# Patient Record
Sex: FEMALE | URBAN - METROPOLITAN AREA
[De-identification: names, ages, dates, MRNs, and addresses within clinical notes are randomized per-mention and may not be internally consistent; named-entity substitution may affect disease eponyms.]

---

## 2020-11-30 ENCOUNTER — OFFICE VISIT (OUTPATIENT)
Dept: URBAN - METROPOLITAN AREA CLINIC 27 | Facility: CLINIC | Age: 60
End: 2020-11-30

## 2020-11-30 PROBLEM — 14760008 CONSTIPATION: Status: ACTIVE | Noted: 2020-11-30

## 2020-11-30 PROBLEM — 2901004 MELENA: Status: ACTIVE | Noted: 2020-11-30

## 2020-11-30 PROBLEM — 238136002 MORBID OBESITY: Status: ACTIVE | Noted: 2020-11-30

## 2020-11-30 PROBLEM — 266435005 GASTRO-ESOPHAGEAL REFLUX DISEASE WITHOUT ESOPHAGITIS: Status: ACTIVE | Noted: 2020-11-30

## 2020-12-24 ENCOUNTER — OFFICE VISIT (OUTPATIENT)
Dept: URBAN - METROPOLITAN AREA SURGERY CENTER 7 | Facility: SURGERY CENTER | Age: 60
End: 2020-12-24

## 2022-03-16 ENCOUNTER — OFFICE VISIT (OUTPATIENT)
Dept: URBAN - METROPOLITAN AREA CLINIC 27 | Facility: CLINIC | Age: 62
End: 2022-03-16

## 2022-04-30 ENCOUNTER — TELEPHONE ENCOUNTER (OUTPATIENT)
Dept: URBAN - METROPOLITAN AREA CLINIC 121 | Facility: CLINIC | Age: 62
End: 2022-04-30

## 2022-05-01 ENCOUNTER — TELEPHONE ENCOUNTER (OUTPATIENT)
Dept: URBAN - METROPOLITAN AREA CLINIC 121 | Facility: CLINIC | Age: 62
End: 2022-05-01

## 2022-05-01 RX ORDER — INSULIN GLARGINE 100 [IU]/ML
INJECTION, SOLUTION SUBCUTANEOUS
Status: ACTIVE | COMMUNITY
Start: 2020-11-30

## 2022-05-01 RX ORDER — LISINOPRIL AND HYDROCHLOROTHIAZIDE TABLETS 20; 12.5 MG/1; MG/1
TABLET ORAL
Status: ACTIVE | COMMUNITY
Start: 2020-11-30

## 2022-05-01 RX ORDER — CYCLOBENZAPRINE HYDROCHLORIDE 10 MG/1
TABLET, FILM COATED ORAL
Status: ACTIVE | COMMUNITY
Start: 2020-11-30

## 2022-05-01 RX ORDER — SIMVASTATIN 20 MG/1
TABLET, FILM COATED ORAL
Status: ACTIVE | COMMUNITY
Start: 2020-11-30

## 2022-05-01 RX ORDER — MELOXICAM 7.5 MG/1
TABLET ORAL
Status: ACTIVE | COMMUNITY
Start: 2020-11-30

## 2022-05-01 RX ORDER — HUMAN INSULIN 100 [IU]/ML
INJECTION, SUSPENSION SUBCUTANEOUS
Status: ACTIVE | COMMUNITY
Start: 2020-11-30

## 2023-08-16 ENCOUNTER — OFFICE VISIT (OUTPATIENT)
Dept: URBAN - METROPOLITAN AREA CLINIC 27 | Facility: CLINIC | Age: 63
End: 2023-08-16
Payer: MEDICARE

## 2023-08-16 VITALS
SYSTOLIC BLOOD PRESSURE: 125 MMHG | HEART RATE: 72 BPM | HEIGHT: 64 IN | BODY MASS INDEX: 49.51 KG/M2 | DIASTOLIC BLOOD PRESSURE: 84 MMHG | WEIGHT: 290 LBS

## 2023-08-16 DIAGNOSIS — E78.5 BORDERLINE HYPERLIPIDEMIA: ICD-10-CM

## 2023-08-16 DIAGNOSIS — I10 PRIMARY HYPERTENSION: ICD-10-CM

## 2023-08-16 DIAGNOSIS — K21.9 ACID REFLUX: ICD-10-CM

## 2023-08-16 DIAGNOSIS — R05.1 ACUTE COUGH: ICD-10-CM

## 2023-08-16 PROBLEM — 162864005: Status: ACTIVE | Noted: 2023-08-16

## 2023-08-16 PROBLEM — 235595009: Status: ACTIVE | Noted: 2023-08-16

## 2023-08-16 PROCEDURE — 99244 OFF/OP CNSLTJ NEW/EST MOD 40: CPT | Performed by: PHYSICIAN ASSISTANT

## 2023-08-16 PROCEDURE — 99214 OFFICE O/P EST MOD 30 MIN: CPT | Performed by: PHYSICIAN ASSISTANT

## 2023-08-16 RX ORDER — LISINOPRIL AND HYDROCHLOROTHIAZIDE TABLETS 20; 12.5 MG/1; MG/1
TABLET ORAL
Status: ACTIVE | COMMUNITY
Start: 2020-11-30

## 2023-08-16 RX ORDER — INSULIN GLARGINE 100 [IU]/ML
INJECTION, SOLUTION SUBCUTANEOUS
Status: ACTIVE | COMMUNITY
Start: 2020-11-30

## 2023-08-16 RX ORDER — HUMAN INSULIN 100 [IU]/ML
INJECTION, SUSPENSION SUBCUTANEOUS
Status: ACTIVE | COMMUNITY
Start: 2020-11-30

## 2023-08-16 RX ORDER — SIMVASTATIN 20 MG/1
TABLET, FILM COATED ORAL
Status: ACTIVE | COMMUNITY
Start: 2020-11-30

## 2023-08-16 RX ORDER — CYCLOBENZAPRINE HYDROCHLORIDE 10 MG/1
TABLET, FILM COATED ORAL
Status: ACTIVE | COMMUNITY
Start: 2020-11-30

## 2023-08-16 RX ORDER — MELOXICAM 7.5 MG/1
TABLET ORAL
Status: ACTIVE | COMMUNITY
Start: 2020-11-30

## 2023-08-16 NOTE — HPI-TODAY'S VISIT:
Ms. Duarte is a 63-year-old femaoe seen at the rerquest of Dr. Powers for suboptimally controlled reflux. A copy of this document will be sent to the referring physician. She has GERD for greater than 10 years. Historically, she had substernal buring and water brash. She used to take Omeprazole 20mg which controlled her symptoms. She stopped taking the medication for 3 years. She hstill had reflux symptoms but tried to manage it with her diet. She restarted Omeprazole 18 months ago with occasional breathrogih symptoms. She developed a productive cough that srated 3 months ago. Her PCP dx'd her with allergies. She tried allergy medication to no avail.  She recently saw Dr. Grullon, ENT,  last week for a worsening cough. Dr. Grullon dx'd her with reflux and increased her Omeprazole to 40 mg bid. She reports an 80% reduction in her cough since increasing the medication. She follows an anti-reflux diet. She has coughing spells while eating. She denies dysphagia, odynophagia, she adela abdomainl pain.

## 2023-08-18 ENCOUNTER — TELEPHONE ENCOUNTER (OUTPATIENT)
Dept: URBAN - METROPOLITAN AREA CLINIC 27 | Facility: CLINIC | Age: 63
End: 2023-08-18

## 2023-08-24 ENCOUNTER — OFFICE VISIT (OUTPATIENT)
Dept: URBAN - METROPOLITAN AREA SURGERY CENTER 7 | Facility: SURGERY CENTER | Age: 63
End: 2023-08-24
Payer: MEDICARE

## 2023-08-24 ENCOUNTER — CLAIMS CREATED FROM THE CLAIM WINDOW (OUTPATIENT)
Dept: URBAN - METROPOLITAN AREA CLINIC 4 | Facility: CLINIC | Age: 63
End: 2023-08-24
Payer: MEDICARE

## 2023-08-24 ENCOUNTER — WEB ENCOUNTER (OUTPATIENT)
Dept: URBAN - METROPOLITAN AREA CLINIC 27 | Facility: CLINIC | Age: 63
End: 2023-08-24

## 2023-08-24 DIAGNOSIS — K29.60 ADENOPAPILLOMATOSIS GASTRICA: ICD-10-CM

## 2023-08-24 DIAGNOSIS — K29.70 GASTRITIS, UNSPECIFIED, WITHOUT BLEEDING: ICD-10-CM

## 2023-08-24 DIAGNOSIS — R12 BURNING REFLUX: ICD-10-CM

## 2023-08-24 PROCEDURE — G8907 PT DOC NO EVENTS ON DISCHARG: HCPCS | Performed by: INTERNAL MEDICINE

## 2023-08-24 PROCEDURE — 88342 IMHCHEM/IMCYTCHM 1ST ANTB: CPT | Performed by: PATHOLOGY

## 2023-08-24 PROCEDURE — 88305 TISSUE EXAM BY PATHOLOGIST: CPT | Performed by: PATHOLOGY

## 2023-08-24 PROCEDURE — 43239 EGD BIOPSY SINGLE/MULTIPLE: CPT | Performed by: INTERNAL MEDICINE

## 2023-08-24 RX ORDER — SIMVASTATIN 20 MG/1
TABLET, FILM COATED ORAL
Status: ACTIVE | COMMUNITY
Start: 2020-11-30

## 2023-08-24 RX ORDER — LISINOPRIL AND HYDROCHLOROTHIAZIDE TABLETS 20; 12.5 MG/1; MG/1
TABLET ORAL
Status: ACTIVE | COMMUNITY
Start: 2020-11-30

## 2023-08-24 RX ORDER — MELOXICAM 7.5 MG/1
TABLET ORAL
Status: ACTIVE | COMMUNITY
Start: 2020-11-30

## 2023-08-24 RX ORDER — CYCLOBENZAPRINE HYDROCHLORIDE 10 MG/1
TABLET, FILM COATED ORAL
Status: ACTIVE | COMMUNITY
Start: 2020-11-30

## 2023-08-24 RX ORDER — INSULIN GLARGINE 100 [IU]/ML
INJECTION, SOLUTION SUBCUTANEOUS
Status: ACTIVE | COMMUNITY
Start: 2020-11-30

## 2023-08-24 RX ORDER — HUMAN INSULIN 100 [IU]/ML
INJECTION, SUSPENSION SUBCUTANEOUS
Status: ACTIVE | COMMUNITY
Start: 2020-11-30

## 2023-11-06 ENCOUNTER — OFFICE VISIT (OUTPATIENT)
Dept: URBAN - METROPOLITAN AREA CLINIC 27 | Facility: CLINIC | Age: 63
End: 2023-11-06
Payer: MEDICARE

## 2023-11-06 ENCOUNTER — OFFICE VISIT (OUTPATIENT)
Dept: URBAN - METROPOLITAN AREA CLINIC 27 | Facility: CLINIC | Age: 63
End: 2023-11-06

## 2023-11-06 VITALS — HEIGHT: 64 IN

## 2023-11-06 DIAGNOSIS — E66.01 MORBID (SEVERE) OBESITY DUE TO EXCESS CALORIES: ICD-10-CM

## 2023-11-06 DIAGNOSIS — K21.9 GASTRO-ESOPHAGEAL REFLUX DISEASE WITHOUT ESOPHAGITIS: ICD-10-CM

## 2023-11-06 PROCEDURE — 99214 OFFICE O/P EST MOD 30 MIN: CPT | Performed by: INTERNAL MEDICINE

## 2023-11-06 RX ORDER — INSULIN GLARGINE 100 [IU]/ML
INJECTION, SOLUTION SUBCUTANEOUS
Status: ACTIVE | COMMUNITY
Start: 2020-11-30

## 2023-11-06 RX ORDER — LISINOPRIL AND HYDROCHLOROTHIAZIDE TABLETS 20; 12.5 MG/1; MG/1
TABLET ORAL
Status: ACTIVE | COMMUNITY
Start: 2020-11-30

## 2023-11-06 RX ORDER — MELOXICAM 7.5 MG/1
TABLET ORAL
Status: ACTIVE | COMMUNITY
Start: 2020-11-30

## 2023-11-06 RX ORDER — CYCLOBENZAPRINE HYDROCHLORIDE 10 MG/1
TABLET, FILM COATED ORAL
Status: ACTIVE | COMMUNITY
Start: 2020-11-30

## 2023-11-06 RX ORDER — HUMAN INSULIN 100 [IU]/ML
INJECTION, SUSPENSION SUBCUTANEOUS
Status: ACTIVE | COMMUNITY
Start: 2020-11-30

## 2023-11-06 RX ORDER — SIMVASTATIN 20 MG/1
TABLET, FILM COATED ORAL
Status: ACTIVE | COMMUNITY
Start: 2020-11-30

## 2023-11-06 NOTE — HPI-TODAY'S VISIT:
This is a sick day 3-year-old female seen for her throat and raspiness she has had some mild cervical dysphagia for the past month and associated cough.  She also has sinus drainage and is using a nasal spray.  Recent EGD showed hiatal hernia biopsies were unrevealing.  She also uses CPAP.  She has reflux and takes omeprazole 40 mg twice a day she is concerned because of her continued symptoms.  She had a colonoscopy in 2020 and is due in 2025

## 2024-04-12 ENCOUNTER — OV EP (OUTPATIENT)
Dept: URBAN - METROPOLITAN AREA CLINIC 27 | Facility: CLINIC | Age: 64
End: 2024-04-12
Payer: COMMERCIAL

## 2024-04-12 VITALS
DIASTOLIC BLOOD PRESSURE: 86 MMHG | BODY MASS INDEX: 44.39 KG/M2 | HEIGHT: 64 IN | RESPIRATION RATE: 17 BRPM | HEART RATE: 67 BPM | WEIGHT: 260 LBS | SYSTOLIC BLOOD PRESSURE: 130 MMHG

## 2024-04-12 DIAGNOSIS — I10 PRIMARY HYPERTENSION: ICD-10-CM

## 2024-04-12 DIAGNOSIS — E66.9 OBESITY (BMI 35.0-39.9 WITHOUT COMORBIDITY): ICD-10-CM

## 2024-04-12 DIAGNOSIS — K21.9 GASTRO-ESOPHAGEAL REFLUX DISEASE WITHOUT ESOPHAGITIS: ICD-10-CM

## 2024-04-12 DIAGNOSIS — R49.0 HOARSENESS: ICD-10-CM

## 2024-04-12 DIAGNOSIS — E11.9 TYPE 2 DIABETES MELLITUS WITHOUT COMPLICATION, WITHOUT LONG-TERM CURRENT USE OF INSULIN: ICD-10-CM

## 2024-04-12 PROCEDURE — 99214 OFFICE O/P EST MOD 30 MIN: CPT | Performed by: INTERNAL MEDICINE

## 2024-04-12 RX ORDER — CYCLOBENZAPRINE HYDROCHLORIDE 10 MG/1
TABLET, FILM COATED ORAL
Status: ACTIVE | COMMUNITY
Start: 2020-11-30

## 2024-04-12 RX ORDER — HUMAN INSULIN 100 [IU]/ML
INJECTION, SUSPENSION SUBCUTANEOUS
Status: ACTIVE | COMMUNITY
Start: 2020-11-30

## 2024-04-12 RX ORDER — MELOXICAM 7.5 MG/1
TABLET ORAL
Status: ACTIVE | COMMUNITY
Start: 2020-11-30

## 2024-04-12 RX ORDER — LISINOPRIL AND HYDROCHLOROTHIAZIDE TABLETS 20; 12.5 MG/1; MG/1
TABLET ORAL
Status: ACTIVE | COMMUNITY
Start: 2020-11-30

## 2024-04-12 RX ORDER — SIMVASTATIN 20 MG/1
TABLET, FILM COATED ORAL
Status: ACTIVE | COMMUNITY
Start: 2020-11-30

## 2024-04-12 RX ORDER — INSULIN GLARGINE 100 [IU]/ML
INJECTION, SOLUTION SUBCUTANEOUS
Status: ACTIVE | COMMUNITY
Start: 2020-11-30

## 2024-04-12 NOTE — PHYSICAL EXAM EYES:
Sclerae : White without injection
repeat c/s, bilateral salpingectomy, possible bilateral tubal ligation

## 2024-04-12 NOTE — HPI-TODAY'S VISIT:
This is a 63-year-old female seen in consultation at the request of Dr. Powers for reflux.  She saw an ENT and was told her pharynx was still swollen.  She randomly has a cough.  She has not noticed much difference on increasing omeprazole to twice a day.  Alex uses a nasal spray for sinus drainage.  She had a colon in 2020 and is due in 2025.  She uses CPAP she has some hoarseness.  She had an upper endoscopy last August that showed gastritis.  She had a recent modified barium swallow that suggested dysmotility and she was complaining of shortness of breath which was thought to maybe be behavioral she was seen by Dr. Grullon

## 2024-04-15 ENCOUNTER — LAB (OUTPATIENT)
Dept: URBAN - METROPOLITAN AREA CLINIC 27 | Facility: CLINIC | Age: 64
End: 2024-04-15

## 2024-04-30 ENCOUNTER — EGD W/ BRV (OUTPATIENT)
Dept: URBAN - METROPOLITAN AREA MEDICAL CENTER 8 | Facility: MEDICAL CENTER | Age: 64
End: 2024-04-30

## 2024-05-20 ENCOUNTER — OFFICE VISIT (OUTPATIENT)
Dept: URBAN - METROPOLITAN AREA TELEHEALTH 2 | Facility: TELEHEALTH | Age: 64
End: 2024-05-20

## 2024-05-22 ENCOUNTER — TELEPHONE ENCOUNTER (OUTPATIENT)
Dept: URBAN - METROPOLITAN AREA CLINIC 27 | Facility: CLINIC | Age: 64
End: 2024-05-22

## 2024-05-28 ENCOUNTER — OFFICE VISIT (OUTPATIENT)
Dept: URBAN - METROPOLITAN AREA MEDICAL CENTER 8 | Facility: MEDICAL CENTER | Age: 64
End: 2024-05-28

## 2024-05-28 RX ORDER — HUMAN INSULIN 100 [IU]/ML
INJECTION, SUSPENSION SUBCUTANEOUS
Status: ACTIVE | COMMUNITY
Start: 2020-11-30

## 2024-05-28 RX ORDER — SIMVASTATIN 20 MG/1
TABLET, FILM COATED ORAL
Status: ACTIVE | COMMUNITY
Start: 2020-11-30

## 2024-05-28 RX ORDER — INSULIN GLARGINE 100 [IU]/ML
INJECTION, SOLUTION SUBCUTANEOUS
Status: ACTIVE | COMMUNITY
Start: 2020-11-30

## 2024-05-28 RX ORDER — CYCLOBENZAPRINE HYDROCHLORIDE 10 MG/1
TABLET, FILM COATED ORAL
Status: ACTIVE | COMMUNITY
Start: 2020-11-30

## 2024-05-28 RX ORDER — LISINOPRIL AND HYDROCHLOROTHIAZIDE TABLETS 20; 12.5 MG/1; MG/1
TABLET ORAL
Status: ACTIVE | COMMUNITY
Start: 2020-11-30

## 2024-05-28 RX ORDER — MELOXICAM 7.5 MG/1
TABLET ORAL
Status: ACTIVE | COMMUNITY
Start: 2020-11-30

## 2024-06-12 ENCOUNTER — OFFICE VISIT (OUTPATIENT)
Dept: URBAN - METROPOLITAN AREA TELEHEALTH 2 | Facility: TELEHEALTH | Age: 64
End: 2024-06-12
Payer: COMMERCIAL

## 2024-06-12 DIAGNOSIS — E66.01 MORBID (SEVERE) OBESITY DUE TO EXCESS CALORIES: ICD-10-CM

## 2024-06-12 DIAGNOSIS — R05.9 COUGH, UNSPECIFIED: ICD-10-CM

## 2024-06-12 DIAGNOSIS — K21.9 GASTRO-ESOPHAGEAL REFLUX DISEASE WITHOUT ESOPHAGITIS: ICD-10-CM

## 2024-06-12 DIAGNOSIS — E11.9 TYPE 2 DIABETES MELLITUS WITHOUT COMPLICATION, WITHOUT LONG-TERM CURRENT USE OF INSULIN: ICD-10-CM

## 2024-06-12 PROCEDURE — 99213 OFFICE O/P EST LOW 20 MIN: CPT | Performed by: INTERNAL MEDICINE

## 2024-06-12 RX ORDER — HUMAN INSULIN 100 [IU]/ML
INJECTION, SUSPENSION SUBCUTANEOUS
Status: ACTIVE | COMMUNITY
Start: 2020-11-30

## 2024-06-12 RX ORDER — LISINOPRIL AND HYDROCHLOROTHIAZIDE TABLETS 20; 12.5 MG/1; MG/1
TABLET ORAL
Status: ACTIVE | COMMUNITY
Start: 2020-11-30

## 2024-06-12 RX ORDER — CYCLOBENZAPRINE HYDROCHLORIDE 10 MG/1
TABLET, FILM COATED ORAL
Status: ACTIVE | COMMUNITY
Start: 2020-11-30

## 2024-06-12 RX ORDER — INSULIN GLARGINE 100 [IU]/ML
INJECTION, SOLUTION SUBCUTANEOUS
Status: ACTIVE | COMMUNITY
Start: 2020-11-30

## 2024-06-12 RX ORDER — MELOXICAM 7.5 MG/1
TABLET ORAL
Status: ACTIVE | COMMUNITY
Start: 2020-11-30

## 2024-06-12 RX ORDER — SIMVASTATIN 20 MG/1
TABLET, FILM COATED ORAL
Status: ACTIVE | COMMUNITY
Start: 2020-11-30

## 2024-06-12 NOTE — HPI-TODAY'S VISIT:
This is a 64-year-old female seen in follow-up consultation for her cough and hoarseness.  She states her cough is doing much better.  Her sinuses are better and she had sinus drainage.  She was recently told she had TMJ and is following up for that.  She is currently taking omeprazole twice a day and recently underwent Bravo and manometry testing.  Manometry showed low pressure at the LES and a short LES.  Villarreal was positive on 1 of 2 days.  She feels that she is doing much better on her current regimen

## 2024-06-13 ENCOUNTER — DASHBOARD ENCOUNTERS (OUTPATIENT)
Age: 64
End: 2024-06-13

## 2025-03-21 ENCOUNTER — OFFICE VISIT (OUTPATIENT)
Dept: URBAN - METROPOLITAN AREA SURGERY CENTER 7 | Facility: SURGERY CENTER | Age: 65
End: 2025-03-21

## 2025-03-28 ENCOUNTER — TELEPHONE ENCOUNTER (OUTPATIENT)
Dept: URBAN - METROPOLITAN AREA CLINIC 5 | Facility: CLINIC | Age: 65
End: 2025-03-28

## 2025-04-03 ENCOUNTER — OFFICE VISIT (OUTPATIENT)
Dept: URBAN - METROPOLITAN AREA SURGERY CENTER 7 | Facility: SURGERY CENTER | Age: 65
End: 2025-04-03

## 2025-04-15 ENCOUNTER — OFFICE VISIT (OUTPATIENT)
Dept: URBAN - METROPOLITAN AREA MEDICAL CENTER 8 | Facility: MEDICAL CENTER | Age: 65
End: 2025-04-15

## 2025-04-15 RX ORDER — SIMVASTATIN 20 MG/1
TABLET, FILM COATED ORAL
Status: ACTIVE | COMMUNITY
Start: 2020-11-30

## 2025-04-15 RX ORDER — LISINOPRIL AND HYDROCHLOROTHIAZIDE TABLETS 20; 12.5 MG/1; MG/1
TABLET ORAL
Status: ACTIVE | COMMUNITY
Start: 2020-11-30

## 2025-04-15 RX ORDER — HUMAN INSULIN 100 [IU]/ML
INJECTION, SUSPENSION SUBCUTANEOUS
Status: ACTIVE | COMMUNITY
Start: 2020-11-30

## 2025-04-15 RX ORDER — INSULIN GLARGINE 100 [IU]/ML
INJECTION, SOLUTION SUBCUTANEOUS
Status: ACTIVE | COMMUNITY
Start: 2020-11-30

## 2025-04-15 RX ORDER — CYCLOBENZAPRINE HYDROCHLORIDE 10 MG/1
TABLET, FILM COATED ORAL
Status: ACTIVE | COMMUNITY
Start: 2020-11-30

## 2025-04-15 RX ORDER — MELOXICAM 7.5 MG/1
TABLET ORAL
Status: ACTIVE | COMMUNITY
Start: 2020-11-30

## 2025-04-16 ENCOUNTER — OFFICE VISIT (OUTPATIENT)
Dept: URBAN - METROPOLITAN AREA SURGERY CENTER 20 | Facility: SURGERY CENTER | Age: 65
End: 2025-04-16

## 2025-05-08 ENCOUNTER — OFFICE VISIT (OUTPATIENT)
Dept: URBAN - METROPOLITAN AREA CLINIC 27 | Facility: CLINIC | Age: 65
End: 2025-05-08
Payer: COMMERCIAL

## 2025-05-08 DIAGNOSIS — R13.10 DYSPHAGIA, UNSPECIFIED: ICD-10-CM

## 2025-05-08 DIAGNOSIS — E11.9 TYPE 2 DIABETES MELLITUS WITHOUT COMPLICATIONS: ICD-10-CM

## 2025-05-08 DIAGNOSIS — R32 UNSPECIFIED URINARY INCONTINENCE: ICD-10-CM

## 2025-05-08 DIAGNOSIS — K58.9 IBS (IRRITABLE BOWEL SYNDROME): ICD-10-CM

## 2025-05-08 PROCEDURE — 99214 OFFICE O/P EST MOD 30 MIN: CPT | Performed by: INTERNAL MEDICINE

## 2025-05-08 RX ORDER — MELOXICAM 7.5 MG/1
TABLET ORAL
Status: ACTIVE | COMMUNITY
Start: 2020-11-30

## 2025-05-08 RX ORDER — CYCLOBENZAPRINE HYDROCHLORIDE 10 MG/1
TABLET, FILM COATED ORAL
Status: ACTIVE | COMMUNITY
Start: 2020-11-30

## 2025-05-08 RX ORDER — INSULIN GLARGINE 100 [IU]/ML
INJECTION, SOLUTION SUBCUTANEOUS
Status: ACTIVE | COMMUNITY
Start: 2020-11-30

## 2025-05-08 RX ORDER — HUMAN INSULIN 100 [IU]/ML
INJECTION, SUSPENSION SUBCUTANEOUS
Status: ACTIVE | COMMUNITY
Start: 2020-11-30

## 2025-05-08 RX ORDER — LISINOPRIL AND HYDROCHLOROTHIAZIDE TABLETS 20; 12.5 MG/1; MG/1
TABLET ORAL
Status: ACTIVE | COMMUNITY
Start: 2020-11-30

## 2025-05-08 RX ORDER — SIMVASTATIN 20 MG/1
TABLET, FILM COATED ORAL
Status: ACTIVE | COMMUNITY
Start: 2020-11-30

## 2025-05-08 NOTE — HPI-HPI
Jessy Duarte is a 65-year-old female who presents with several ongoing health concerns. She reports persistent trouble swallowing, with food often getting stuck, which has been an issue for about a year and a half. Previous Barvo was ositive with low pressure LES. She has previously undergone a manometry test. Jessy also experiences bowel incontinence, which occurs quite frequently. She has not yet started taking fiber supplements as previously recommended, which may help bulk up her stools and reduce episodes of incontinence. Additionally, she has been losing weight, approximately 60 pounds, which she attributes to taking Ozempic. While this medication has helped with weight loss, it may also contribute to gastrointestinal side effects. Jessy inquires about irritable bowel syndrome, and the doctor explains that her chronic bowel changes, including loose stools and constipation, fall under this catch-all term. Her diabetes appears to be well-controlled, with recent blood sugar levels around 5.7 to 5.8.

## 2025-05-29 ENCOUNTER — OFFICE VISIT (OUTPATIENT)
Dept: URBAN - METROPOLITAN AREA CLINIC 27 | Facility: CLINIC | Age: 65
End: 2025-05-29

## 2025-07-10 ENCOUNTER — OFFICE VISIT (OUTPATIENT)
Dept: URBAN - METROPOLITAN AREA CLINIC 27 | Facility: CLINIC | Age: 65
End: 2025-07-10

## 2025-07-25 ENCOUNTER — OFFICE VISIT (OUTPATIENT)
Dept: URBAN - METROPOLITAN AREA CLINIC 27 | Facility: CLINIC | Age: 65
End: 2025-07-25
Payer: COMMERCIAL

## 2025-07-25 DIAGNOSIS — R13.10 DYSPHAGIA, UNSPECIFIED: ICD-10-CM

## 2025-07-25 DIAGNOSIS — R05.9 COUGH, UNSPECIFIED: ICD-10-CM

## 2025-07-25 DIAGNOSIS — K59.00 CONSTIPATION, UNSPECIFIED: ICD-10-CM

## 2025-07-25 DIAGNOSIS — K21.9 GASTRO-ESOPHAGEAL REFLUX DISEASE WITHOUT ESOPHAGITIS: ICD-10-CM

## 2025-07-25 PROCEDURE — 99213 OFFICE O/P EST LOW 20 MIN: CPT | Performed by: INTERNAL MEDICINE

## 2025-07-25 RX ORDER — CYCLOBENZAPRINE HYDROCHLORIDE 10 MG/1
TABLET, FILM COATED ORAL
Status: ACTIVE | COMMUNITY
Start: 2020-11-30

## 2025-07-25 RX ORDER — BUPROPION HYDROCHLORIDE 450 MG/1
1 TABLET IN THE MORNING TABLET, FILM COATED, EXTENDED RELEASE ORAL ONCE A DAY
Status: ACTIVE | COMMUNITY

## 2025-07-25 RX ORDER — PANTOPRAZOLE SODIUM 40 MG/1
1 TABLET 1/2 TO 1 HOUR BEFORE MORNING MEAL TABLET, DELAYED RELEASE ORAL ONCE A DAY
Status: ACTIVE | COMMUNITY

## 2025-07-25 RX ORDER — LISINOPRIL AND HYDROCHLOROTHIAZIDE TABLETS 20; 12.5 MG/1; MG/1
TABLET ORAL
Status: ACTIVE | COMMUNITY
Start: 2020-11-30

## 2025-07-25 RX ORDER — SIMVASTATIN 20 MG/1
TABLET, FILM COATED ORAL
Status: ACTIVE | COMMUNITY
Start: 2020-11-30

## 2025-07-25 RX ORDER — EVOLOCUMAB 140 MG/ML
1 ML INJECTION, SOLUTION SUBCUTANEOUS
Status: ACTIVE | COMMUNITY

## 2025-07-25 RX ORDER — GABAPENTIN 300 MG/1
1 CAPSULE CAPSULE ORAL ONCE A DAY
Qty: 30 | Status: ACTIVE | COMMUNITY

## 2025-07-25 RX ORDER — SEMAGLUTIDE 2.68 MG/ML
AS DIRECTED INJECTION, SOLUTION SUBCUTANEOUS
Status: ACTIVE | COMMUNITY

## 2025-07-25 RX ORDER — HUMAN INSULIN 100 [IU]/ML
INJECTION, SUSPENSION SUBCUTANEOUS
Status: DISCONTINUED | COMMUNITY
Start: 2020-11-30

## 2025-07-25 RX ORDER — MELOXICAM 7.5 MG/1
TABLET ORAL
Status: DISCONTINUED | COMMUNITY
Start: 2020-11-30

## 2025-07-25 RX ORDER — INSULIN GLARGINE 100 [IU]/ML
INJECTION, SOLUTION SUBCUTANEOUS
Status: ACTIVE | COMMUNITY
Start: 2020-11-30

## 2025-07-25 RX ORDER — SERTRALINE 100 MG/1
1 TABLET TABLET, FILM COATED ORAL ONCE A DAY
Status: ACTIVE | COMMUNITY

## 2025-07-25 NOTE — HPI-HPI
Jessy Duarte, a 65-year-old female, presented for a chronic condition follow-up focused on her ongoing bowel, reflux, cough, and swallowing symptoms. She described her bowel symptoms as somewhat improved since starting fiber, though she only uses it about once a week because daily use felt excessive. Despite this, her symptoms are sometimes controllable, and she continues to use fiber occasionally to help manage them. Regarding her upper gastrointestinal symptoms, she reported that issues persist, including cough and trouble at swallowing foods.  occasional heartburn and regurgitation. She noted that these symptoms are not as frequent as before, but she sometimes experiences a cough after eating or swallowing. She denied frequent regurgitation or burning, but acknowledged occasional episodes. She is currently taking pantoprazole, and referenced a prior barium study that showed reflux of fluid all the way up. She also discussed a persistent cough that is often triggered by swallowing, particularly after meals. She described a sensation of food getting stuck in her throat, sometimes requiring her to "hock it up," and noted that she has to fight to swallow at times. She recalled that the barium study did not reveal a blockage but did show some muscle discoordination.